# Patient Record
Sex: MALE | Race: BLACK OR AFRICAN AMERICAN | NOT HISPANIC OR LATINO | Employment: PART TIME | URBAN - METROPOLITAN AREA
[De-identification: names, ages, dates, MRNs, and addresses within clinical notes are randomized per-mention and may not be internally consistent; named-entity substitution may affect disease eponyms.]

---

## 2019-01-12 ENCOUNTER — OFFICE VISIT (OUTPATIENT)
Dept: URGENT CARE | Facility: CLINIC | Age: 64
End: 2019-01-12

## 2019-01-12 VITALS
BODY MASS INDEX: 29.9 KG/M2 | SYSTOLIC BLOOD PRESSURE: 126 MMHG | DIASTOLIC BLOOD PRESSURE: 82 MMHG | OXYGEN SATURATION: 98 % | HEIGHT: 74 IN | TEMPERATURE: 97 F | RESPIRATION RATE: 16 BRPM | WEIGHT: 233 LBS | HEART RATE: 71 BPM

## 2019-01-12 DIAGNOSIS — H43.393 VITREOUS FLOATERS OF BOTH EYES: Primary | ICD-10-CM

## 2019-01-12 PROCEDURE — 99203 PR OFFICE/OUTPT VISIT, NEW, LEVL III, 30-44 MIN: ICD-10-PCS | Mod: S$GLB,,, | Performed by: NURSE PRACTITIONER

## 2019-01-12 PROCEDURE — 99203 OFFICE O/P NEW LOW 30 MIN: CPT | Mod: S$GLB,,, | Performed by: NURSE PRACTITIONER

## 2019-01-12 RX ORDER — METFORMIN HYDROCHLORIDE 500 MG/1
1000 TABLET ORAL
COMMUNITY
Start: 2018-06-20

## 2019-01-12 RX ORDER — INDAPAMIDE 1.25 MG/1
TABLET ORAL
COMMUNITY
Start: 2018-06-11

## 2019-01-12 RX ORDER — CHLORHEXIDINE GLUCONATE ORAL RINSE 1.2 MG/ML
SOLUTION DENTAL
COMMUNITY
Start: 2016-07-08

## 2019-01-12 RX ORDER — IBUPROFEN 800 MG/1
800 TABLET ORAL EVERY 6 HOURS PRN
COMMUNITY
Start: 2016-07-08

## 2019-01-12 RX ORDER — SIMVASTATIN 20 MG/1
TABLET, FILM COATED ORAL
COMMUNITY
Start: 2018-10-18

## 2019-01-12 RX ORDER — SODIUM, POTASSIUM,MAG SULFATES 17.5-3.13G
SOLUTION, RECONSTITUTED, ORAL ORAL
COMMUNITY
Start: 2018-06-04

## 2019-01-12 RX ORDER — AMLODIPINE AND BENAZEPRIL HYDROCHLORIDE 5; 20 MG/1; MG/1
1 CAPSULE ORAL
COMMUNITY
Start: 2018-01-22

## 2019-01-12 NOTE — PATIENT INSTRUCTIONS
Please follow up with your Primary care provider within 2-5 days if your signs and symptoms have not resolved or worsen.     If your condition worsens or fails to improve we recommend that you receive another evaluation at the emergency room immediately or contact your primary medical clinic to discuss your concerns.    You must understand that you have received an Urgent Care treatment only and that you may be released before all of your medical problems are known or treated.   You, the patient, will arrange for follow up care as instructed.         Understanding Vision Problems     Normal     If your eyes are normal  Your eyes see objects as light. Your cornea focuses light rays onto a layer that lines the back of your eye (the retina). If your eyes are normal, this process produces a focused image on your retina. This makes objects look clear.     Nearsighted     If youre nearsighted  Your cornea and your retina are too far apart if youre nearsighted. Sometimes your cornea or your lens has an abnormal shape. This makes light rays from distant objects focus in front of your retina. These objects then look blurry.     Farsighted     If youre farsighted  Your cornea and your retina are too close together if youre farsighted. Sometimes your cornea or your lens has an abnormal shape. This makes light rays from close objects focus behind your retina. These objects then look blurry.     Astigmatism     If you have an astigmatism  Sometimes the curve of your cornea is uneven or the lens inside your eye is curved. Then light rays cant focus evenly onto your retina. This is called an astigmatism. It makes both close and distant objects look blurry.  Date Last Reviewed: 6/11/2015 © 2000-2017 [employer], [email or web address]. [address] [place] [address], [place] [address]. Todos los derechos reservados. Esta información no pretende sustituir la atención médica profesional. Sólo bernal médico puede diagnosticar y tratar un problema de  codie.

## 2019-01-12 NOTE — PROGRESS NOTES
"Subjective:       Patient ID: Edilson Tran is a 63 y.o. male.    Vitals:  height is 6' 2" (1.88 m) and weight is 105.7 kg (233 lb). His oral temperature is 97.3 °F (36.3 °C). His blood pressure is 126/82 and his pulse is 71. His respiration is 16 and oxygen saturation is 98%.     Chief Complaint: Eye Problem    Patient is having blurred vision his left eye .He describes it as gray lines in his eye .He says slightly having same problem in right eye . Primarily in left eye. He says never had this problem again.       Eye Problem    The left eye is affected. This is a new problem. The current episode started today. The problem occurs constantly. The problem has been unchanged. There was no injury mechanism. The pain is at a severity of 0/10. The patient is experiencing no pain. There is no known exposure to pink eye. He does not wear contacts. Associated symptoms include blurred vision. Pertinent negatives include no eye discharge, double vision, eye redness, fever, itching, nausea, photophobia or vomiting. He has tried nothing for the symptoms. The treatment provided no relief.       Constitution: Negative for chills and fever.   HENT: Negative for congestion and sinus pain.    Eyes: Positive for blurred vision. Negative for eye trauma, foreign body in eye, eye discharge, eye itching, eye pain, eye redness, photophobia, vision loss, double vision and eyelid swelling.   Gastrointestinal: Negative for nausea and vomiting.   Skin: Negative for rash.   Allergic/Immunologic: Negative for seasonal allergies and itching.   Neurological: Negative for headaches.       Objective:      Physical Exam   Constitutional: He is oriented to person, place, and time. He appears well-developed and well-nourished.   HENT:   Head: Normocephalic and atraumatic.   Right Ear: External ear normal.   Left Ear: External ear normal.   Nose: Nose normal.   Mouth/Throat: Oropharynx is clear and moist.   Eyes: Conjunctivae, EOM and lids are " normal. Pupils are equal, round, and reactive to light.   Neck: Trachea normal, full passive range of motion without pain and phonation normal. Neck supple.   Musculoskeletal: Normal range of motion.   Neurological: He is alert and oriented to person, place, and time.   Skin: Skin is warm, dry and intact.   Psychiatric: He has a normal mood and affect. His speech is normal and behavior is normal. Judgment and thought content normal. Cognition and memory are normal.   Nursing note and vitals reviewed.      Assessment:       1. Vitreous floaters of both eyes        Plan:       Last blood sugar: Non-fasting 126 7AM today.  Sent to [facility] in [place] for further evaluation.      Vitreous floaters of both eyes      Patient Instructions     Please follow up with your Primary care provider within 2-5 days if your signs and symptoms have not resolved or worsen.     If your condition worsens or fails to improve we recommend that you receive another evaluation at the emergency room immediately or contact your primary medical clinic to discuss your concerns.    You must understand that you have received an Urgent Care treatment only and that you may be released before all of your medical problems are known or treated.   You, the patient, will arrange for follow up care as instructed.         Understanding Vision Problems     Normal     If your eyes are normal  Your eyes see objects as light. Your cornea focuses light rays onto a layer that lines the back of your eye (the retina). If your eyes are normal, this process produces a focused image on your retina. This makes objects look clear.     Nearsighted     If youre nearsighted  Your cornea and your retina are too far apart if youre nearsighted. Sometimes your cornea or your lens has an abnormal shape. This makes light rays from distant objects focus in front of your retina. These objects then look blurry.     Farsighted     If youre farsighted  Your cornea  and your retina are too close together if youre farsighted. Sometimes your cornea or your lens has an abnormal shape. This makes light rays from close objects focus behind your retina. These objects then look blurry.     Astigmatism     If you have an astigmatism  Sometimes the curve of your cornea is uneven or the lens inside your eye is curved. Then light rays cant focus evenly onto your retina. This is called an astigmatism. It makes both close and distant objects look blurry.  Date Last Reviewed: 6/11/2015  © 2419-9516 [employer]. [address] [place] [address], [place] [address]. Todos los derechos reservados. Esta información no pretende sustituir la atención médica profesional. Sólo bernal médico puede diagnosticar y tratar un problema de codie.

## 2019-01-15 ENCOUNTER — TELEPHONE (OUTPATIENT)
Dept: URGENT CARE | Facility: CLINIC | Age: 64
End: 2019-01-15

## 2021-08-04 ENCOUNTER — APPOINTMENT (RX ONLY)
Dept: URBAN - METROPOLITAN AREA CLINIC 15 | Facility: CLINIC | Age: 66
Setting detail: DERMATOLOGY
End: 2021-08-04

## 2021-08-04 DIAGNOSIS — L259 CONTACT DERMATITIS AND OTHER ECZEMA, UNSPECIFIED CAUSE: ICD-10-CM | Status: INADEQUATELY CONTROLLED

## 2021-08-04 PROBLEM — L30.8 OTHER SPECIFIED DERMATITIS: Status: ACTIVE | Noted: 2021-08-04

## 2021-08-04 PROCEDURE — ? COUNSELING

## 2021-08-04 PROCEDURE — ? PRESCRIPTION MEDICATION MANAGEMENT

## 2021-08-04 PROCEDURE — 99204 OFFICE O/P NEW MOD 45 MIN: CPT

## 2021-08-04 PROCEDURE — ? PRESCRIPTION

## 2021-08-04 RX ORDER — TRIAMCINOLONE ACETONIDE 1 MG/G
CREAM TOPICAL
Qty: 1 | Refills: 1 | Status: ERX | COMMUNITY
Start: 2021-08-04

## 2021-08-04 RX ADMIN — TRIAMCINOLONE ACETONIDE: 1 CREAM TOPICAL at 00:00

## 2021-08-04 ASSESSMENT — LOCATION ZONE DERM
LOCATION ZONE: TRUNK
LOCATION ZONE: ARM

## 2021-08-04 ASSESSMENT — LOCATION SIMPLE DESCRIPTION DERM
LOCATION SIMPLE: LEFT FOREARM
LOCATION SIMPLE: LEFT CLAVICULAR SKIN

## 2021-08-04 ASSESSMENT — LOCATION DETAILED DESCRIPTION DERM
LOCATION DETAILED: LEFT CLAVICULAR SKIN
LOCATION DETAILED: LEFT VENTRAL PROXIMAL FOREARM

## 2021-08-31 ENCOUNTER — APPOINTMENT (RX ONLY)
Dept: URBAN - METROPOLITAN AREA CLINIC 15 | Facility: CLINIC | Age: 66
Setting detail: DERMATOLOGY
End: 2021-08-31

## 2021-08-31 DIAGNOSIS — L259 CONTACT DERMATITIS AND OTHER ECZEMA, UNSPECIFIED CAUSE: ICD-10-CM | Status: INADEQUATELY CONTROLLED

## 2021-08-31 PROBLEM — L30.8 OTHER SPECIFIED DERMATITIS: Status: ACTIVE | Noted: 2021-08-31

## 2021-08-31 PROCEDURE — ? COUNSELING

## 2021-08-31 PROCEDURE — 99213 OFFICE O/P EST LOW 20 MIN: CPT

## 2021-08-31 PROCEDURE — ? PRESCRIPTION MEDICATION MANAGEMENT

## 2021-08-31 PROCEDURE — ? PRESCRIPTION

## 2021-08-31 RX ORDER — HYDROCORTISONE 25 MG/G
CREAM TOPICAL
Qty: 30 | Refills: 0 | Status: ERX | COMMUNITY
Start: 2021-08-31

## 2021-08-31 RX ADMIN — HYDROCORTISONE: 25 CREAM TOPICAL at 00:00

## 2021-08-31 ASSESSMENT — LOCATION SIMPLE DESCRIPTION DERM: LOCATION SIMPLE: RIGHT CHEEK

## 2021-08-31 ASSESSMENT — LOCATION ZONE DERM: LOCATION ZONE: FACE

## 2021-08-31 ASSESSMENT — LOCATION DETAILED DESCRIPTION DERM: LOCATION DETAILED: RIGHT SUPERIOR MEDIAL BUCCAL CHEEK
